# Patient Record
Sex: MALE | Race: WHITE | NOT HISPANIC OR LATINO | Employment: OTHER | URBAN - METROPOLITAN AREA
[De-identification: names, ages, dates, MRNs, and addresses within clinical notes are randomized per-mention and may not be internally consistent; named-entity substitution may affect disease eponyms.]

---

## 2017-01-04 ENCOUNTER — HOSPITAL ENCOUNTER (EMERGENCY)
Facility: HOSPITAL | Age: 62
Discharge: HOME | End: 2017-01-04
Attending: EMERGENCY MEDICINE
Payer: MEDICAID

## 2017-01-04 VITALS
WEIGHT: 229.25 LBS | SYSTOLIC BLOOD PRESSURE: 149 MMHG | BODY MASS INDEX: 35.98 KG/M2 | HEIGHT: 67 IN | OXYGEN SATURATION: 96 % | DIASTOLIC BLOOD PRESSURE: 86 MMHG | RESPIRATION RATE: 20 BRPM | HEART RATE: 88 BPM | TEMPERATURE: 98 F

## 2017-01-04 DIAGNOSIS — M25.522: Primary | ICD-10-CM

## 2017-01-04 DIAGNOSIS — M25.562 CHRONIC PAIN OF LEFT KNEE: ICD-10-CM

## 2017-01-04 DIAGNOSIS — G89.29 CHRONIC PAIN OF LEFT KNEE: ICD-10-CM

## 2017-01-04 PROCEDURE — 99283 EMERGENCY DEPT VISIT LOW MDM: CPT

## 2017-01-04 PROCEDURE — 63600175 PHARM REV CODE 636 W HCPCS: Performed by: PHYSICIAN ASSISTANT

## 2017-01-04 RX ORDER — IBUPROFEN 600 MG/1
600 TABLET ORAL EVERY 6 HOURS PRN
Qty: 20 TABLET | Refills: 0 | Status: SHIPPED | OUTPATIENT
Start: 2017-01-04 | End: 2017-10-23

## 2017-01-04 RX ORDER — KETOROLAC TROMETHAMINE 30 MG/ML
30 INJECTION, SOLUTION INTRAMUSCULAR; INTRAVENOUS
Status: COMPLETED | OUTPATIENT
Start: 2017-01-04 | End: 2017-01-04

## 2017-01-04 RX ADMIN — KETOROLAC TROMETHAMINE 30 MG: 30 INJECTION, SOLUTION INTRAMUSCULAR at 02:01

## 2017-01-04 NOTE — ED NOTES
Pt c/o L arm pain and L knee pain after a fall in August.  Pt states he saw an orthopedic in December and was told he could do physical therapy.  Pt states he continues with pain.

## 2017-01-04 NOTE — ED AVS SNAPSHOT
[facility]  [address] [facility] [address]  [facility] [address]               Jackson Jacinto   2017  1:06 PM   ED    Description:  Male : 1955   Department:  [facility]           Your Care was Coordinated By:     Provider Role From To    Navin Rodriguez Jr., MD Attending Provider 17 [id number] --    MAYNOR Neal Physician Assistant 17 [phone] --      Reason for Visit     Arm Pain           Diagnoses this Visit        Comments    Pain, joint, upper arm, left    -  Primary       ED Disposition     None           To Do List           Follow-up Information     Call [facility] - ORTHOPEDICS.    Specialties:  Orthopedic Surgery, Orthopedic Surgery    Why:  to schedule appointment as soon as possible    Contact information:    [address] [facility] [place] [facility] [address] [phone]          Follow up with Aric Douglass MD.    Specialty:  Orthopedic Surgery    Contact information:    [address]  [facility] [address] [phone]          Go to [facility].    Specialty:  Family Medicine    Contact information:    [address] [facility] [address]  [facility] [address] [phone]       These Medications        Disp Refills Start End    ibuprofen (ADVIL,MOTRIN) 600 MG tablet 20 tablet 0 2017     Take 1 tablet (600 mg total) by mouth every 6 (six) hours as needed for Pain. - Oral      [facility]     [facility] Care [facility] -  Assistance  Registered nurses in the [facility] provide clinical advisement, health education, appointment booking, and other advisory services.  Call for this free service at [phone].             Medications           Message regarding Medications     Verify the changes and/or additions to your medication regime listed below are the same as discussed with your clinician today.  If any of these changes or additions are incorrect, please notify  "your healthcare provider.        START taking these NEW medications        Refills    ibuprofen (ADVIL,MOTRIN) 600 MG tablet 0    Sig: Take 1 tablet (600 mg total) by mouth every 6 (six) hours as needed for Pain.    Class: Print    Route: Oral      These medications were administered today        Dose Freq    ketorolac injection 30 mg 30 mg ED 1 Time    Sig: Inject 30 mg into the muscle ED 1 Time.    Class: Normal    Route: Intramuscular    Cosign for Ordering: Accepted by Navin Rodriguez Jr., MD on 1/4/2017  1:58 PM      STOP taking these medications     hydrocodone-acetaminophen 5-325mg (NORCO) 5-325 mg per tablet Take 1 tablet by mouth every 6 (six) hours as needed for Pain.    naproxen (NAPROSYN) 500 MG tablet Take 1 tablet (500 mg total) by mouth 2 (two) times daily with meals.    hydrocodone-acetaminophen 10-325mg (NORCO)  mg Tab Take by mouth.           Verify that the below list of medications is an accurate representation of the medications you are currently taking.  If none reported, the list may be blank. If incorrect, please contact your healthcare provider. Carry this list with you in case of emergency.           Current Medications     ibuprofen (ADVIL,MOTRIN) 600 MG tablet Take 1 tablet (600 mg total) by mouth every 6 (six) hours as needed for Pain.           Clinical Reference Information           Your Vitals Were     BP Pulse Temp Resp Height Weight    149/86 88 98.4 °F (36.9 °C) (Oral) 20 5' 7" (1.702 m) 104 kg (229 lb 4.5 oz)    SpO2 BMI             96% 35.91 kg/m2         Allergies as of 1/4/2017     No Known Allergies      Immunizations Administered on Date of Encounter - 1/4/2017     None      ED Micro, Lab, POCT     None      ED Imaging Orders     None      Discharge References/Attachments     RICE (ENGLISH)    ELBOW FRACTURE (ENGLISH)      [facility] Sign-Up     Activating your [facility] account is as easy as 1-2-3!     1) Visit [email or web address], select Sign Up Now, enter this " activation code and your date of birth, then select Next.  [email or web address]  Expires: 2/18/2017  2:37 PM      2) Create a username and password to use when you visit [facility] in the future and select a security question in case you lose your password and select Next.    3) Enter your e-mail address and click Sign Up!    Additional Information  If you have questions, please e-mail [email or web address] or call [phone] to talk to our [facility] staff. Remember, [facility] is NOT to be used for urgent needs. For medical emergencies, dial 911.         Smoking Cessation     If you would like to quit smoking:   You may be eligible for free services if you are a [place] resident and started smoking cigarettes before September 1, 1988.  Call the Smoking [employer]essation [employer] (SCT) toll free at (910) 565-7763 or (437) 477-0121.   Call 9-766-QUIT-NOW if you do not meet the above criteria.             [facility] complies with applicable Federal civil rights laws and does not discriminate on the basis of race, color, national origin, age, disability, or sex.        Language Assistance Services     ATTENTION: Language assistance services are available, free of charge. Please call [phone].      ATENCIÓN: Si habla español, tiene a guidry disposición servicios gratuitos de asistencia lingüística. Llame al [phone].     CHÚ Ý: N?u b?n nói Ti?ng Vi?t, có các d?ch v? h? tr? ngôn ng? mi?n phí dành cho b?n. G?i s? [phone].

## 2017-01-04 NOTE — ED PROVIDER NOTES
Encounter Date: 1/4/2017       History     Chief Complaint   Patient presents with    Arm Pain     pt states he fractured left arm and left ribs and injured left knee in August. States he was unable to follow up with an orthopedic, so returned here in November and was told arm was still fractured. C/o continued pain to left arm and left knee.     Review of patient's allergies indicates:  No Known Allergies  HPI Comments: Jackson Jacinto 61 y.o. male presented to the ED with c/o continued left elbow and left knee pain that began after a fall in August. He was informed of radial head fracture at that time but never followed up with PCP and/or orthopedic. This is now his second visit for continued pain and once again did not follow up with orthopedics after last visit.  He did have imaging completed during last visit as there seems to have been some confusion as to another fall although patient denies any falls other than initial one. He states that pain is exacerbated by certain movements, including long extension of the left arm and prolonged standing and ambulation. He denies any numbness, tingling, fever, or chills. He states that he has an independent bruise to the left medial thigh. He denies any pain to this site. He denies trying nay medications for the symptoms.     The history is provided by the patient.     Past Medical History   Diagnosis Date    Chronic back pain      No past medical history pertinent negatives.  History reviewed. No pertinent past surgical history.  History reviewed. No pertinent family history.  Social History   Substance Use Topics    Smoking status: Former Smoker    Smokeless tobacco: None    Alcohol use No     Review of Systems   Constitutional: Negative for activity change, appetite change, chills and fever.   Respiratory: Negative for shortness of breath.    Cardiovascular: Negative for chest pain.   Gastrointestinal: Negative for nausea and vomiting.   Musculoskeletal: Positive for  arthralgias. Negative for back pain, joint swelling and myalgias.        Chronic Left lateral and posterior elbow pain, chronic left knee pain   Skin: Negative for rash.        Bruise of the left medial thigh   Neurological: Negative for weakness and numbness.   Hematological: Does not bruise/bleed easily.       Physical Exam   Initial Vitals   BP Pulse Resp Temp SpO2   01/04/17 1216 01/04/17 1216 01/04/17 1216 01/04/17 1216 01/04/17 1216   151/88 96 18 98.3 °F (36.8 °C) 96 %     Physical Exam    Nursing note and vitals reviewed.  Constitutional: Vital signs are normal. He appears well-developed and well-nourished. He is cooperative.  Non-toxic appearance. He does not appear ill. No distress.   HENT:   Head: Normocephalic and atraumatic.   Eyes: Conjunctivae and lids are normal.   Neck: Normal range of motion.   Cardiovascular: Normal rate and regular rhythm.   Pulses:       Radial pulses are 2+ on the left side.   Pulmonary/Chest: No respiratory distress.   Abdominal: Soft. Normal appearance.   Musculoskeletal:        Left elbow: He exhibits decreased range of motion. He exhibits no swelling, no effusion and no deformity. Tenderness found. Radial head and lateral epicondyle tenderness noted.        Left knee: He exhibits decreased range of motion. He exhibits no swelling, no effusion, no ecchymosis, no deformity, no LCL laxity and no MCL laxity. Tenderness found.        Legs:  Limited ROM of the left elbow approximately 120 degree at extension with moderate amount of pain. No swelling, erythema or obvious bony deformity presently. TTP of the medial meniscal region of the left knee with no swelling, effusion, bony deformity or erythema     Neurological: He is alert and oriented to person, place, and time. Coordination normal. GCS eye subscore is 4. GCS verbal subscore is 5. GCS motor subscore is 6.   Skin: Skin is warm and dry. Bruising noted. No abrasion, no rash and no abscess noted. No erythema.   Psychiatric: He  has a normal mood and affect. His speech is normal and behavior is normal. Thought content normal.         ED Course   Procedures  Labs Reviewed - No data to display     Jackson Jacinto 61 y.o. male presented to the ED with c/o continued left elbow and left knee pain that began after a fall in August. He was informed of radial head fracture at that time but never followed up with PCP and/or orthopedic. This is now his second visit for continued pain and once again did not follow up with orthopedics after last visit.  He did have imaging completed during last visit as there seems to have been some confusion as to another fall although patient denies any falls other than initial one. He states that pain is exacerbated by certain movements, including long extension of the left arm and prolonged standing and ambulation. He denies any numbness, tingling, fever, or chills. He states that he has an independent bruise to the left medial thigh. He denies any pain to this site. He denies trying nay medications for the symptoms.  ROS positive for chronic left arm and left knee pain.  Physical exam reveals patient well appearing in no obvious distress. Limited ROM of the left elbow approximately 120 degree at extension with moderate amount of pain. No swelling, erythema or obvious bony deformity presently. TTP of the medial meniscal region of the left knee with no swelling, effusion, bony deformity or erythema. Patient has contusion of the medial thigh with no TTP or bony tenderness.    DDX: arthritic pain from fracture, decreased ROM due to previous injury, knee pain with meniscal injury    ED management: reviewed images from previous visit in 11/16 with no new fracture at that time. No imaging today as patient denies any new trauma. Instructed patient on importance of ortho visit for referral to PT to gain mobility and possible outpatient MRI of knee. We will send home with NSAIDs    Impression/Plan:The primary encounter diagnosis  was Pain, joint, upper arm, left. A diagnosis of Chronic pain of left knee was also pertinent to this visit. Discharged with motrin. Patient will follow up with Primary.  Patient cautioned on when to return to ED.  Pt. Understands and agrees with current treatment plan                         ED Course     Clinical Impression:   The primary encounter diagnosis was Pain, joint, upper arm, left. A diagnosis of Chronic pain of left knee was also pertinent to this visit.          MAYNOR Neal  01/06/17 1640

## 2017-10-23 ENCOUNTER — HOSPITAL ENCOUNTER (EMERGENCY)
Facility: HOSPITAL | Age: 62
Discharge: HOME | End: 2017-10-23
Attending: EMERGENCY MEDICINE
Payer: MEDICAID

## 2017-10-23 VITALS
OXYGEN SATURATION: 98 % | TEMPERATURE: 99 F | WEIGHT: 229 LBS | DIASTOLIC BLOOD PRESSURE: 85 MMHG | BODY MASS INDEX: 35.94 KG/M2 | HEART RATE: 81 BPM | SYSTOLIC BLOOD PRESSURE: 148 MMHG | HEIGHT: 67 IN | RESPIRATION RATE: 16 BRPM

## 2017-10-23 DIAGNOSIS — S53.402A ELBOW SPRAIN, LEFT, INITIAL ENCOUNTER: Primary | ICD-10-CM

## 2017-10-23 DIAGNOSIS — T14.90XA TRAUMA: ICD-10-CM

## 2017-10-23 PROCEDURE — 99283 EMERGENCY DEPT VISIT LOW MDM: CPT

## 2017-10-23 RX ORDER — MELOXICAM 7.5 MG/1
7.5 TABLET ORAL DAILY
Qty: 12 TABLET | Refills: 0 | Status: SHIPPED | OUTPATIENT
Start: 2017-10-23

## 2017-10-23 NOTE — ED PROVIDER NOTES
Encounter Date: 10/23/2017       History     Chief Complaint   Patient presents with    Arm Injury     pt had a fall today and no c/o left arm pain. pt has hx pf previous break to that arm     The patient presents emergency Department with pain to his left arm around the elbow area.  The patient's eighth he was jumping over a ditch today and tripped and fell.  She caught his fall with his outstretched left arm.  The patient states he broke his arm August 2016 and was never seen by an orthopedist afterward.  He denies any other complaints, did not hit his head, no neck pain, no chest pain no abdominal pain and no lower back pain at this time.  He has a history of chronic low back pain.          Review of patient's allergies indicates:  No Known Allergies  Past Medical History:   Diagnosis Date    Chronic back pain      History reviewed. No pertinent surgical history.  History reviewed. No pertinent family history.  Social History   Substance Use Topics    Smoking status: Former Smoker    Smokeless tobacco: Never Used    Alcohol use No     Review of Systems   Constitutional: Negative for fever.   HENT: Negative for sore throat.    Respiratory: Negative for shortness of breath.    Cardiovascular: Negative for chest pain.   Gastrointestinal: Negative for nausea.   Genitourinary: Negative for dysuria.   Musculoskeletal: Negative for back pain.   Skin: Negative for rash.   Neurological: Negative for weakness.   Hematological: Does not bruise/bleed easily.       Physical Exam     Initial Vitals [10/23/17 1302]   BP Pulse Resp Temp SpO2   (!) 183/91 92 18 98.5 °F (36.9 °C) 100 %      MAP       121.67         Physical Exam    Constitutional: He appears well-developed and well-nourished.   HENT:   Head: Normocephalic and atraumatic.   Eyes: EOM are normal. Pupils are equal, round, and reactive to light.   Neck: Normal range of motion. Neck supple.   Cardiovascular: Normal rate, regular rhythm, normal heart sounds and  intact distal pulses.   Pulmonary/Chest: Breath sounds normal.   Abdominal: Soft. Bowel sounds are normal. He exhibits no distension. There is no tenderness. There is no rebound and no guarding.   Musculoskeletal: He exhibits tenderness (chest humerus across the elbow and down to the mid shaft radius ulna.  He also has some tenderness to palpation of the left wrist.  Hand with full range of motion and is neurovascularly intact.  There is minimal swelling to his arm or hand.). He exhibits no edema.   Neurological: He is alert and oriented to person, place, and time.   Skin: Skin is warm and dry.   Psychiatric: He has a normal mood and affect. His behavior is normal. Judgment and thought content normal.         ED Course   Procedures  Labs Reviewed - No data to display          Medical Decision Making:   Clinical Tests:   Radiological Study: Ordered and Reviewed                   ED Course      Clinical Impression:   The primary encounter diagnosis was Elbow sprain, left, initial encounter. A diagnosis of Trauma was also pertinent to this visit.                           Sheridan Oreilly MD  10/23/17 4843

## 2017-10-23 NOTE — ED NOTES
"Left elbow/forearm pain that has been ongoing since breaking it last August.  Has tried to follow up with ortho but has been unable.  Pt reports pain is constant but heard a "pop" in left forearm today after falling and landing on forearm.  No obvious deformity, no obvious swelling.  Radial/ulnar pulses strong and regular.  Cap refill < 2 sec to digits.  Nailbeds pink.   "